# Patient Record
(demographics unavailable — no encounter records)

---

## 2025-02-24 NOTE — HISTORY OF PRESENT ILLNESS
[de-identified] : vomiting [FreeTextEntry6] : vomiting from last night, 3 times last night, multiple episodes this morning  no fever  no diarrhea  no abdominal pain  no cough  no sore throat  no headache

## 2025-02-24 NOTE — DISCUSSION/SUMMARY
[FreeTextEntry1] : 3 yo with vomiting zofran in office, may continue prn fluids, pedialyte in office, tolerated follow up if symptoms worsen or persist more than 3 days to Er if vomiting continues without holding down fluids